# Patient Record
Sex: MALE | Race: OTHER | NOT HISPANIC OR LATINO
[De-identification: names, ages, dates, MRNs, and addresses within clinical notes are randomized per-mention and may not be internally consistent; named-entity substitution may affect disease eponyms.]

---

## 2017-08-01 ENCOUNTER — RESULT REVIEW (OUTPATIENT)
Age: 57
End: 2017-08-01

## 2019-03-04 ENCOUNTER — RESULT REVIEW (OUTPATIENT)
Age: 59
End: 2019-03-04

## 2019-06-14 ENCOUNTER — RESULT REVIEW (OUTPATIENT)
Age: 59
End: 2019-06-14

## 2021-05-12 ENCOUNTER — APPOINTMENT (OUTPATIENT)
Dept: COLORECTAL SURGERY | Facility: CLINIC | Age: 61
End: 2021-05-12
Payer: COMMERCIAL

## 2021-05-12 VITALS
BODY MASS INDEX: 28.88 KG/M2 | WEIGHT: 195 LBS | SYSTOLIC BLOOD PRESSURE: 166 MMHG | HEART RATE: 104 BPM | TEMPERATURE: 98.1 F | DIASTOLIC BLOOD PRESSURE: 112 MMHG | HEIGHT: 69 IN

## 2021-05-12 DIAGNOSIS — F17.210 NICOTINE DEPENDENCE, CIGARETTES, UNCOMPLICATED: ICD-10-CM

## 2021-05-12 DIAGNOSIS — Z82.49 FAMILY HISTORY OF ISCHEMIC HEART DISEASE AND OTHER DISEASES OF THE CIRCULATORY SYSTEM: ICD-10-CM

## 2021-05-12 DIAGNOSIS — J45.909 UNSPECIFIED ASTHMA, UNCOMPLICATED: ICD-10-CM

## 2021-05-12 DIAGNOSIS — I10 ESSENTIAL (PRIMARY) HYPERTENSION: ICD-10-CM

## 2021-05-12 DIAGNOSIS — Z83.3 FAMILY HISTORY OF DIABETES MELLITUS: ICD-10-CM

## 2021-05-12 DIAGNOSIS — K21.9 GASTRO-ESOPHAGEAL REFLUX DISEASE W/OUT ESOPHAGITIS: ICD-10-CM

## 2021-05-12 PROCEDURE — 46600 DIAGNOSTIC ANOSCOPY SPX: CPT

## 2021-05-12 PROCEDURE — 99202 OFFICE O/P NEW SF 15 MIN: CPT | Mod: 25

## 2021-05-12 PROCEDURE — 99072 ADDL SUPL MATRL&STAF TM PHE: CPT

## 2021-05-12 RX ORDER — LOSARTAN POTASSIUM 100 MG/1
TABLET, FILM COATED ORAL
Refills: 0 | Status: ACTIVE | COMMUNITY

## 2021-05-12 RX ORDER — HYDROCHLOROTHIAZIDE 12.5 MG/1
CAPSULE ORAL
Refills: 0 | Status: ACTIVE | COMMUNITY

## 2021-05-12 RX ORDER — ALBUTEROL SULFATE 90 UG/1
AEROSOL, METERED RESPIRATORY (INHALATION)
Refills: 0 | Status: ACTIVE | COMMUNITY

## 2021-05-12 NOTE — PHYSICAL EXAM
[Abdomen Masses] : No abdominal masses [Abdomen Tenderness] : ~T No ~M abdominal tenderness [No HSM] : no hepatosplenomegaly [Posterior] : posteriorly [Normal] : was normal [None] : no [de-identified] : Mild perianal fissuring of the anal margin extending into the buttock cleft [FreeTextEntry1] : A lighted anoscope was passed into the anal canal and the entire anal mucosal surface was inspected..  The findings revealed moderate internal hemorrhoids.\par Posterior and right posterior polypoid 1.5  cm lesions at the proximal anal canal

## 2021-05-12 NOTE — ASSESSMENT
[FreeTextEntry1] : I have recommended that the patient in a rectal polyp for definitive assessment of this lesion. The risks, benefits and alternatives were outlined including but not limited to bleeding, infection, need for future procedures.\par \par In addition the patient will be admitted for a screening colonoscopy given the incomplete colonoscopy secondary to incomplete preparation.\par \par I have reviewed with the patient in detail the role of colonoscopy any evaluation of possible colon polyps and cancer. The risks, alternatives and benefits of the procedure were detailed and client including but not limited to risk of bleeding, risk of infection, risk of perforation and requiring further and potential surgery, and other secondary complications of the procedure. The colonoscopy will be performed to evaluate for possible polyps and cancer and if possible a polypectomy or removal of the polyp will be performed. Bowel preparation instructions were reviewed. The need for long-term surveillance based on findings of the colonoscopy were discussed.\par The patient consents to the planned procedure.\par \par

## 2021-05-12 NOTE — HISTORY OF PRESENT ILLNESS
[FreeTextEntry1] : 61 y/o M presents for evaluation of rectal mass, referred by Dr. Fuentes \par \par EGD and Colonoscopy completed 5/11/21 for GERD symptoms and h/o polyps\par - sever LA class C reflux esophagitis\par - possible christensen's esophagus, multiple biopsies \par - mild peptic stricture\par - diaphragmatic hernia without obstruction or gangrene\par - two large, nodular anorectal junction masses were noted and biopsied \par - otherwise prep was poor and precluded an evaluation of the colorectum \par \par Last colonoscopy completed within the last 10 years, several benign polyps removed\par \par Denies abdominal or rectal pain, rectal bleeding, itching\par Denies N/V, change in appetite or weight \par \par Patient to start on Omeprazole once GI gets prior authorization. Has  been using prilosec OTC\par BH:daily\par Denies constipation, diarrhea or straining\par No use of stool softeners, fiber supplements or laxatives. Has used enemas in the past PRN for constipation, has not used recently \par Reports adequate dietary fiber intake. Currently drinking 64 oz. of water daily \par (-) anal receptive sex \par Denies FMH of GI disorders or GI cancers\par No ASA/NSAIDs last 7 days \par

## 2021-06-04 DIAGNOSIS — Z01.818 ENCOUNTER FOR OTHER PREPROCEDURAL EXAMINATION: ICD-10-CM

## 2021-06-07 ENCOUNTER — APPOINTMENT (OUTPATIENT)
Dept: DISASTER EMERGENCY | Facility: CLINIC | Age: 61
End: 2021-06-07

## 2021-06-08 LAB — SARS-COV-2 N GENE NPH QL NAA+PROBE: NOT DETECTED

## 2021-06-09 ENCOUNTER — TRANSCRIPTION ENCOUNTER (OUTPATIENT)
Age: 61
End: 2021-06-09

## 2021-06-09 VITALS
HEART RATE: 94 BPM | RESPIRATION RATE: 18 BRPM | WEIGHT: 197.75 LBS | DIASTOLIC BLOOD PRESSURE: 95 MMHG | HEIGHT: 69 IN | TEMPERATURE: 98 F | SYSTOLIC BLOOD PRESSURE: 158 MMHG | OXYGEN SATURATION: 98 %

## 2021-06-09 NOTE — ASU PATIENT PROFILE, ADULT - PMH
Anal lesion    Asthma, unspecified asthma severity, unspecified whether complicated, unspecified whether persistent    Benign neoplasm of colon, unspecified part of colon    BPH with elevated PSA    Dyslipidemia    Hemangioma, unspecified site    History of gastroesophageal reflux (GERD)    HTN (hypertension)    Internal hemorrhoids    Onychodystrophy    Other specified forms of hearing loss    Pes planus, unspecified laterality

## 2021-06-10 ENCOUNTER — RESULT REVIEW (OUTPATIENT)
Age: 61
End: 2021-06-10

## 2021-06-10 ENCOUNTER — INPATIENT (INPATIENT)
Facility: HOSPITAL | Age: 61
LOS: 1 days | Discharge: HOME | DRG: 395 | End: 2021-06-12
Attending: SURGERY | Admitting: SURGERY
Payer: COMMERCIAL

## 2021-06-10 ENCOUNTER — APPOINTMENT (OUTPATIENT)
Dept: COLORECTAL SURGERY | Facility: HOSPITAL | Age: 61
End: 2021-06-10

## 2021-06-10 DIAGNOSIS — Z98.890 OTHER SPECIFIED POSTPROCEDURAL STATES: Chronic | ICD-10-CM

## 2021-06-10 PROCEDURE — 99222 1ST HOSP IP/OBS MODERATE 55: CPT

## 2021-06-10 PROCEDURE — 45171 EXC RECT TUM TRANSANAL PART: CPT | Mod: GC

## 2021-06-10 RX ORDER — ACETAMINOPHEN 500 MG
650 TABLET ORAL ONCE
Refills: 0 | Status: DISCONTINUED | OUTPATIENT
Start: 2021-06-10 | End: 2021-06-12

## 2021-06-10 RX ORDER — HEPARIN SODIUM 5000 [USP'U]/ML
5000 INJECTION INTRAVENOUS; SUBCUTANEOUS EVERY 8 HOURS
Refills: 0 | Status: DISCONTINUED | OUTPATIENT
Start: 2021-06-10 | End: 2021-06-12

## 2021-06-10 RX ORDER — SOD SULF/SODIUM/NAHCO3/KCL/PEG
4000 SOLUTION, RECONSTITUTED, ORAL ORAL ONCE
Refills: 0 | Status: COMPLETED | OUTPATIENT
Start: 2021-06-10 | End: 2021-06-10

## 2021-06-10 RX ORDER — PANTOPRAZOLE SODIUM 20 MG/1
40 TABLET, DELAYED RELEASE ORAL ONCE
Refills: 0 | Status: COMPLETED | OUTPATIENT
Start: 2021-06-10 | End: 2021-06-10

## 2021-06-10 RX ORDER — SOD SULF/SODIUM/NAHCO3/KCL/PEG
4000 SOLUTION, RECONSTITUTED, ORAL ORAL ONCE
Refills: 0 | Status: DISCONTINUED | OUTPATIENT
Start: 2021-06-10 | End: 2021-06-10

## 2021-06-10 RX ORDER — HYDRALAZINE HCL 50 MG
5 TABLET ORAL ONCE
Refills: 0 | Status: COMPLETED | OUTPATIENT
Start: 2021-06-10 | End: 2021-06-10

## 2021-06-10 RX ORDER — ONDANSETRON 8 MG/1
4 TABLET, FILM COATED ORAL ONCE
Refills: 0 | Status: DISCONTINUED | OUTPATIENT
Start: 2021-06-10 | End: 2021-06-12

## 2021-06-10 RX ORDER — POLYETHYLENE GLYCOL 3350 17 G/17G
17 POWDER, FOR SOLUTION ORAL ONCE
Refills: 0 | Status: DISCONTINUED | OUTPATIENT
Start: 2021-06-10 | End: 2021-06-10

## 2021-06-10 RX ADMIN — Medication 10 MILLIGRAM(S): at 21:06

## 2021-06-10 RX ADMIN — Medication 5 MILLIGRAM(S): at 21:10

## 2021-06-10 RX ADMIN — Medication 4000 MILLILITER(S): at 17:39

## 2021-06-10 RX ADMIN — PANTOPRAZOLE SODIUM 40 MILLIGRAM(S): 20 TABLET, DELAYED RELEASE ORAL at 14:43

## 2021-06-10 RX ADMIN — HEPARIN SODIUM 5000 UNIT(S): 5000 INJECTION INTRAVENOUS; SUBCUTANEOUS at 21:06

## 2021-06-10 RX ADMIN — HEPARIN SODIUM 5000 UNIT(S): 5000 INJECTION INTRAVENOUS; SUBCUTANEOUS at 14:43

## 2021-06-10 NOTE — CONSULT NOTE ADULT - ATTENDING COMMENTS
Will plan for a colonoscopy tomorrow prior with poor prep.  Excision of anal masses today.  Will give split prep Golytely this víctor and tomorrow to ensure a good prep.

## 2021-06-10 NOTE — BRIEF OPERATIVE NOTE - OPERATION/FINDINGS
Anal and pudendal nerve blocks. Two large masses identified, largest right anterior and smaller right posterior. Excised with Ligasure and oversewn with 3-0 Vicryl figure of 8.

## 2021-06-10 NOTE — BRIEF OPERATIVE NOTE - DISPOSITION
Anesthesia ROS/Med Hx    Overall Review:  Pts. EKG was reviewed and Pts.echo was reviewed     Pulmonary Review:    Pt. positive for sleep apnea - CPAP    Neuro/Psych Review:    Negative for all neuro/psych ROS    Cardiovascular Review:    Pt. positive for CHF  Pt. positive for CAD  Pt. positive for hyperlipidemia  Pt. positive for dysrhythmias - Paroxysmal A-fib    GI/HEPATIC/RENAL Review:    Pt. positive for GERD  Pt. positive for liver disease  Pt. positive for renal disease    End/Other Review:    Pt. positive for diabetes  Pt. positive for anemia      Anesthesia Plan     ASA Status: 3  Anesthesia Type: MAC  Reviewed: Lab Results, Medications, Beta Blocker Status, Nursing Notes, Consultations, EKG, Problem List, Pre-Induction Reassessment, NPO Status, Past Med History, Patient Summary and Allergies  The proposed anesthetic plan, including its risks and benefits, have been discussed with the Patient - along with the risks and benefits of alternatives.  Questions were encouraged and answered and the patient and/or representative agrees to proceed.  Blood Products: Not Anticipated      Physical Exam  Mallampati: II  TM Distance: >3 FB  Neck ROM: Full  Cardio Rhythm: Irregular  pulmonary exam normal  dental exam normal                  
PACU then regional

## 2021-06-10 NOTE — PROGRESS NOTE ADULT - SUBJECTIVE AND OBJECTIVE BOX
POST OP CHECK    Procedure: Transanal excision of anorectal mass x 2  Surgeon: Dr. Parham     S: Pt seen and examined at bedside. Reports no acute complaints. Denies F, N, V, CP, SOB, FLOYD, calf tenderness. Pain controlled with medication. Pt is HD stable.    O:  T(C): 36.8 (06-10-21 @ 14:20), Max: 36.8 (06-10-21 @ 14:20)  T(F): 98.3 (06-10-21 @ 14:20), Max: 98.3 (06-10-21 @ 14:20)  HR: 79 (06-10-21 @ 14:20) (60 - 82)  BP: 145/89 (06-10-21 @ 14:20) (129/89 - 155/93)  RR: 20 (06-10-21 @ 14:20) (15 - 24)  SpO2: 95% (06-10-21 @ 14:20) (95% - 100%)  Wt(kg): --    Gen: NAD, resting comfortably in bed, A&O x3   C/V: NSR  Pulm: Nonlabored breathing, no respiratory distress  Abd: abdomen soft, nd, nt   Rectal: Dressing c/d/i. No oozing or bleeding noted.   Extrem: WWP, no calf tenderness or edema, SCDs in place    A/P: 60M PMHx esophagitis, recent diagnosis of anorectal masses x 2 identified on screening colonoscopy 5/11/2021 presents for transanal excision of anorectal masses (6/10).    Pain/nausea control prn  IS/OOB  CLD, NPO @ mn w/ sips/chips   SCDs, HSQ  Plan for colonoscopy (Dr. Milian) 6/11, bowel prep w/ golytely 2L @ 5pm/dulcolax 10mg PO x 1 at bedtime / 2L golytely @ 5am 6/11  AM labs

## 2021-06-10 NOTE — H&P ADULT - ASSESSMENT
60M PMHx esophagitis, recent diagnosis of anorectal masses x 2 identified on screening colonoscopy 5/11/2021 presents for transanal excision of anorectal mass.     to OR for above  admit to colorectal surgery  colonoscopy tomorrow with Dr. Milian due to poor prep on prior scope

## 2021-06-10 NOTE — H&P ADULT - HISTORY OF PRESENT ILLNESS
60M PMHx esophagitis, recent diagnosis of anorectal masses x 2 identified on screening colonoscopy 5/11/2021 presents for transanal excision of anorectal mass. EGD and colonoscopy demonstrated two large nodular anorectal junction masses with poor prep, precluded evaluation of colon. Last prior colonoscopy with several benign polyps. Asymptomatic.    Denies constipation, diarrhea or straining  No use of stool softeners, fiber supplements or laxatives. Has used enemas in the past PRN for constipation, has not used recently   Reports adequate dietary fiber intake. Currently drinking 64 oz. of water daily   (-) anal receptive sex   Denies FMH of GI disorders or GI cancers  No ASA/NSAIDs last 7 days

## 2021-06-10 NOTE — CONSULT NOTE ADULT - SUBJECTIVE AND OBJECTIVE BOX
GASTROENTEROLOGY CONSULT NOTE  HPI:  60M PMHx esophagitis, recent diagnosis of anorectal masses x 2 identified on screening colonoscopy 5/11/2021 presents for transanal excision of anorectal mass. EGD and colonoscopy demonstrated two large nodular anorectal junction masses with poor prep, precluded evaluation of colon. Last prior colonoscopy with several benign polyps. Asymptomatic.    Denies constipation, diarrhea or straining  No use of stool softeners, fiber supplements or laxatives. Has used enemas in the past PRN for constipation, has not used recently   Reports adequate dietary fiber intake. Currently drinking 64 oz. of water daily   (-) anal receptive sex   Denies FMH of GI disorders or GI cancers  No ASA/NSAIDs last 7 days    (10 Steve 2021 11:19)    Allergies    aspirin (Unknown)  latex (Unknown)    Intolerances      Home Medications:    MEDICATIONS:  MEDICATIONS  (STANDING):  heparin   Injectable 5000 Unit(s) SubCutaneous every 8 hours  pantoprazole    Tablet 40 milliGRAM(s) Oral Once  polyethylene glycol 3350 17 Gram(s) Oral Once    MEDICATIONS  (PRN):  acetaminophen   Tablet .. 650 milliGRAM(s) Oral Once PRN Moderate Pain (4 - 6)  ondansetron Injectable 4 milliGRAM(s) IV Push Once PRN Nausea    PAST MEDICAL & SURGICAL HISTORY:  HTN (hypertension)    Dyslipidemia    Onychodystrophy    Pes planus, unspecified laterality    BPH with elevated PSA    Anal lesion    Other specified forms of hearing loss    Benign neoplasm of colon, unspecified part of colon    Hemangioma, unspecified site    Internal hemorrhoids    History of gastroesophageal reflux (GERD)    Asthma    History of prostate surgery  bx 2014      FAMILY HISTORY:  No pertinent family history in first degree relatives      SOCIAL HISTORY:  [place]: [ ] Current, [ ] Former, [ ] Never; Pack Years:  Alcohol:  Illicit Drugs:    REVIEW OF SYSTEMS:  CONSTITUTIONAL: No fevers or chills  HEENT: No visual changes; No vertigo or throat pain   NECK: No pain or stiffness  RESPIRATORY: No cough, wheezing, hemoptysis; No shortness of breath  CARDIOVASCULAR: No chest pain or palpitations  GASTROINTESTINAL: as above  GENITOURINARY: No dysuria, frequency or hematuria  NEUROLOGICAL: No numbness or weakness  SKIN: No itching, burning, rashes, or lesions   All other 10 review of systems is negative unless indicated above.    Vital Signs Last 24 Hrs  T(C): 36.2 (10 Steve 2021 12:21), Max: 36.7 (09 Jun 2021 13:42)  T(F): 97.2 (10 Steve 2021 12:21), Max: 98.1 (09 Jun 2021 13:42)  HR: 76 (10 Steve 2021 12:36) (76 - 94)  BP: 129/89 (10 Steve 2021 12:36) (129/89 - 158/95)  BP(mean): 104 (10 Steve 2021 12:36) (104 - 110)  RR: 17 (10 Steve 2021 12:36) (17 - 19)  SpO2: 98% (10 Steve 2021 12:36) (97% - 98%)      PHYSICAL EXAM:    General: Well developed; in no acute distress  Eyes: Anicteric sclerae, moist conjunctivae  HENT: Moist mucous membranes  Neck: Trachea midline, supple  Lungs: Normal respiratory effort  Cardiovascular: RRR  Abdomen: Soft, non-tender non-distended; No rebound or guarding  Extremities: Normal range of motion, No clubbing, cyanosis or edema  Neurological: Alert and oriented x3  Skin: Warm and dry. No obvious rash    LABS:                  RADIOLOGY & ADDITIONAL STUDIES:     Reviewed

## 2021-06-10 NOTE — CONSULT NOTE ADULT - ASSESSMENT
60M PMHx esophagitis, recent diagnosis of anorectal masses x 2 identified on screening colonoscopy 5/11/2021 presents for transanal excision of anorectal mass. EGD and colonoscopy demonstrated two large nodular anorectal junction masses with poor prep, precluded evaluation of colon. Last prior colonoscopy with several benign polyps. GI is consulted for a repeat colonoscopy as the previous one was incomplete due to poor prep    EGD and Colonoscopy completed 5/11/21 for GERD symptoms and h/o polyps:   - sever LA class C reflux esophagitis  - possible christensen's esophagus, multiple biopsies   - mild peptic stricture  - diaphragmatic hernia without obstruction or gangrene  - two large, nodular anorectal junction masses were noted and biopsied   - otherwise prep was poor and precluded an evaluation of the colorectum     Last colonoscopy completed within the last 10 years, several benign polyps removed    # Rectal mass  POD 0 s/p excision via transanal approach  Needs colonoscopy to evaluate remaining colon for any synchronous lesions/polyps  - Scheduled for colonoscopy tomorrow  - Please order split-dose prep with 4L GoLytely: 2L GoLytely given at 17:00 today and an additional 2L at 05:00 tomorrow  - NPO past MN except medications with sips of water (and remainining 2L GoLytely at 05:00 am as mentioned above)    Recommendations discussed with primary team  Plan discussed with GI service attending    Chase Braxton MD  PGY-4 GI fellow  Pager: [phone]         60M PMHx esophagitis, recent diagnosis of anorectal masses x 2 identified on screening colonoscopy 5/11/2021 presents for transanal excision of anorectal mass. EGD and colonoscopy demonstrated two large nodular anorectal junction masses with poor prep, precluded evaluation of colon. Last prior colonoscopy with several benign polyps. GI is consulted for a repeat colonoscopy as the previous one was incomplete due to poor prep  EGD and Colonoscopy was done previously on 5/11/21 for GERD symptoms and h/o polyps:   - sever LA class C reflux esophagitis  - possible christensen's esophagus, multiple biopsies   - mild peptic stricture  - diaphragmatic hernia without obstruction or gangrene  - two large, nodular anorectal junction masses were noted and biopsied   - otherwise prep was poor and precluded an evaluation of the colorectum     Last colonoscopy completed within the last 10 years, several benign polyps removed    # Rectal mass  POD 0 s/p excision via transanal approach  Needs colonoscopy to evaluate remaining colon for any synchronous lesions/polyps  - Scheduled for colonoscopy tomorrow  - Please order split-dose prep with 4L GoLytely: 2L GoLytely given at 17:00 today and an additional 2L at 05:00 tomorrow  - PO Dulcolax 10 mg tonight   - NPO past MN except medications with sips of water (and remainining 2L GoLytely at 05:00 am as mentioned above)    Recommendations discussed with primary team  Plan discussed with GI service attending    Chase Braxton MD  PGY-4 GI fellow  Pager: [phone]

## 2021-06-10 NOTE — H&P ADULT - NSICDXPASTMEDICALHX_GEN_ALL_CORE_FT
PAST MEDICAL HISTORY:  Anal lesion     Asthma     Benign neoplasm of colon, unspecified part of colon     BPH with elevated PSA     Dyslipidemia     Hemangioma, unspecified site     History of gastroesophageal reflux (GERD)     HTN (hypertension)     Internal hemorrhoids     Onychodystrophy     Other specified forms of hearing loss     Pes planus, unspecified laterality

## 2021-06-11 ENCOUNTER — RESULT REVIEW (OUTPATIENT)
Age: 61
End: 2021-06-11

## 2021-06-11 LAB
ANION GAP SERPL CALC-SCNC: 13 MMOL/L — SIGNIFICANT CHANGE UP (ref 5–17)
BUN SERPL-MCNC: 9 MG/DL — SIGNIFICANT CHANGE UP (ref 7–23)
CALCIUM SERPL-MCNC: 10.7 MG/DL — HIGH (ref 8.4–10.5)
CHLORIDE SERPL-SCNC: 105 MMOL/L — SIGNIFICANT CHANGE UP (ref 96–108)
CO2 SERPL-SCNC: 25 MMOL/L — SIGNIFICANT CHANGE UP (ref 22–31)
COVID-19 SPIKE DOMAIN AB INTERP: POSITIVE
COVID-19 SPIKE DOMAIN ANTIBODY RESULT: 12.2 U/ML — HIGH
CREAT SERPL-MCNC: 0.88 MG/DL — SIGNIFICANT CHANGE UP (ref 0.5–1.3)
GLUCOSE SERPL-MCNC: 85 MG/DL — SIGNIFICANT CHANGE UP (ref 70–99)
HCT VFR BLD CALC: 50 % — SIGNIFICANT CHANGE UP (ref 39–50)
HGB BLD-MCNC: 16.4 G/DL — SIGNIFICANT CHANGE UP (ref 13–17)
MAGNESIUM SERPL-MCNC: 2 MG/DL — SIGNIFICANT CHANGE UP (ref 1.6–2.6)
MCHC RBC-ENTMCNC: 28.8 PG — SIGNIFICANT CHANGE UP (ref 27–34)
MCHC RBC-ENTMCNC: 32.8 GM/DL — SIGNIFICANT CHANGE UP (ref 32–36)
MCV RBC AUTO: 87.7 FL — SIGNIFICANT CHANGE UP (ref 80–100)
NRBC # BLD: 0 /100 WBCS — SIGNIFICANT CHANGE UP (ref 0–0)
PHOSPHATE SERPL-MCNC: 2.6 MG/DL — SIGNIFICANT CHANGE UP (ref 2.5–4.5)
PLATELET # BLD AUTO: 387 K/UL — SIGNIFICANT CHANGE UP (ref 150–400)
POTASSIUM SERPL-MCNC: 3.8 MMOL/L — SIGNIFICANT CHANGE UP (ref 3.5–5.3)
POTASSIUM SERPL-SCNC: 3.8 MMOL/L — SIGNIFICANT CHANGE UP (ref 3.5–5.3)
RBC # BLD: 5.7 M/UL — SIGNIFICANT CHANGE UP (ref 4.2–5.8)
RBC # FLD: 14.5 % — SIGNIFICANT CHANGE UP (ref 10.3–14.5)
SARS-COV-2 IGG+IGM SERPL QL IA: 12.2 U/ML — HIGH
SARS-COV-2 IGG+IGM SERPL QL IA: POSITIVE
SODIUM SERPL-SCNC: 143 MMOL/L — SIGNIFICANT CHANGE UP (ref 135–145)
WBC # BLD: 8.73 K/UL — SIGNIFICANT CHANGE UP (ref 3.8–10.5)
WBC # FLD AUTO: 8.73 K/UL — SIGNIFICANT CHANGE UP (ref 3.8–10.5)

## 2021-06-11 PROCEDURE — 88305 TISSUE EXAM BY PATHOLOGIST: CPT | Mod: 26

## 2021-06-11 PROCEDURE — 45380 COLONOSCOPY AND BIOPSY: CPT | Mod: GC

## 2021-06-11 RX ORDER — BENZOCAINE AND MENTHOL 5; 1 G/100ML; G/100ML
1 LIQUID ORAL
Refills: 0 | Status: DISCONTINUED | OUTPATIENT
Start: 2021-06-11 | End: 2021-06-12

## 2021-06-11 RX ORDER — POTASSIUM PHOSPHATE, MONOBASIC POTASSIUM PHOSPHATE, DIBASIC 236; 224 MG/ML; MG/ML
15 INJECTION, SOLUTION INTRAVENOUS ONCE
Refills: 0 | Status: COMPLETED | OUTPATIENT
Start: 2021-06-11 | End: 2021-06-11

## 2021-06-11 RX ORDER — SODIUM CHLORIDE 9 MG/ML
1000 INJECTION, SOLUTION INTRAVENOUS
Refills: 0 | Status: DISCONTINUED | OUTPATIENT
Start: 2021-06-11 | End: 2021-06-11

## 2021-06-11 RX ORDER — BENZOCAINE AND MENTHOL 5; 1 G/100ML; G/100ML
1 LIQUID ORAL
Refills: 0 | Status: DISCONTINUED | OUTPATIENT
Start: 2021-06-11 | End: 2021-06-11

## 2021-06-11 RX ADMIN — BENZOCAINE AND MENTHOL 1 LOZENGE: 5; 1 LIQUID ORAL at 18:39

## 2021-06-11 RX ADMIN — HEPARIN SODIUM 5000 UNIT(S): 5000 INJECTION INTRAVENOUS; SUBCUTANEOUS at 21:39

## 2021-06-11 RX ADMIN — HEPARIN SODIUM 5000 UNIT(S): 5000 INJECTION INTRAVENOUS; SUBCUTANEOUS at 05:29

## 2021-06-11 RX ADMIN — POTASSIUM PHOSPHATE, MONOBASIC POTASSIUM PHOSPHATE, DIBASIC 62.5 MILLIMOLE(S): 236; 224 INJECTION, SOLUTION INTRAVENOUS at 10:08

## 2021-06-11 RX ADMIN — HEPARIN SODIUM 5000 UNIT(S): 5000 INJECTION INTRAVENOUS; SUBCUTANEOUS at 14:01

## 2021-06-11 NOTE — PROGRESS NOTE ADULT - SUBJECTIVE AND OBJECTIVE BOX
INTERVAL HPI/OVERNIGHT EVENTS: patient finished 2L of Golytel at 10 pm, started 2L of Golytel at 5 am , hydralazine 5 mg    STATUS POST: 6/10: transanal excision of anorectal mass x 2    POST OPERATIVE DAY #: 1    SUBJECTIVE: Pt seen and examined at bedside this am by surgery team. Reports no acute complaints. Tolerating diet, pain well controlled. Denies f/n/v/cp/sob.    MEDICATIONS  (STANDING):  heparin   Injectable 5000 Unit(s) SubCutaneous every 8 hours  lactated ringers. 1000 milliLiter(s) (100 mL/Hr) IV Continuous <Continuous>  potassium phosphate IVPB 15 milliMole(s) IV Intermittent once    MEDICATIONS  (PRN):  acetaminophen   Tablet .. 650 milliGRAM(s) Oral Once PRN Moderate Pain (4 - 6)  ondansetron Injectable 4 milliGRAM(s) IV Push Once PRN Nausea      Vital Signs Last 24 Hrs  T(C): 36.5 (11 Jun 2021 08:47), Max: 36.8 (10 Steve 2021 14:20)  T(F): 97.7 (11 Jun 2021 08:47), Max: 98.3 (10 Steve 2021 14:20)  HR: 88 (11 Jun 2021 08:47) (60 - 93)  BP: 166/112 (11 Jun 2021 08:47) (129/89 - 171/99)  BP(mean): 115 (10 Steve 2021 13:21) (104 - 115)  RR: 16 (11 Jun 2021 08:47) (15 - 24)  SpO2: 97% (11 Jun 2021 08:47) (95% - 100%)    PHYSICAL EXAM:    Constitutional: A&Ox3, NAD    Respiratory: non labored breathing, no respiratory distress    Cardiovascular: NSR, RRR    Gastrointestinal: abdomen soft, nd, nt     Rectal: Dressing c/d/i. No oozing or bleeding noted.     Extremities: WWP, no calf tenderness or edema, SCDs in place    I&O's Detail    10 Steve 2021 07:01  -  11 Jun 2021 07:00  --------------------------------------------------------  IN:    Lactated Ringers: 400 mL  Total IN: 400 mL    OUT:    Voided (mL): 550 mL  Total OUT: 550 mL    Total NET: -150 mL      11 Jun 2021 07:01  -  11 Jun 2021 09:36  --------------------------------------------------------  IN:    Lactated Ringers: 300 mL  Total IN: 300 mL    OUT:  Total OUT: 0 mL    Total NET: 300 mL          LABS:                        16.4   8.73  )-----------( 387      ( 11 Jun 2021 07:28 )             50.0     06-11    143  |  105  |  9   ----------------------------<  85  3.8   |  25  |  0.88    Ca    10.7<H>      11 Jun 2021 07:28  Phos  2.6     06-11  Mg     2.0     06-11            RADIOLOGY & ADDITIONAL STUDIES:

## 2021-06-11 NOTE — CHART NOTE - NSCHARTNOTEFT_GEN_A_CORE
Colonoscopy (with Dr Milian)    A sub-centimeter polyp in the sigmoid colon (50 cm from AV) s/p excision biopsy with cold forceps. Otherwise normal colonoscopy.       Plan:   Await pathology results  resume diet  Next screening colonoscopy based on the rectal lesion path

## 2021-06-12 ENCOUNTER — TRANSCRIPTION ENCOUNTER (OUTPATIENT)
Age: 61
End: 2021-06-12

## 2021-06-12 VITALS
OXYGEN SATURATION: 96 % | HEART RATE: 68 BPM | DIASTOLIC BLOOD PRESSURE: 95 MMHG | RESPIRATION RATE: 18 BRPM | TEMPERATURE: 98 F | SYSTOLIC BLOOD PRESSURE: 151 MMHG

## 2021-06-12 PROCEDURE — 83735 ASSAY OF MAGNESIUM: CPT

## 2021-06-12 PROCEDURE — 86769 SARS-COV-2 COVID-19 ANTIBODY: CPT

## 2021-06-12 PROCEDURE — 36415 COLL VENOUS BLD VENIPUNCTURE: CPT

## 2021-06-12 PROCEDURE — 80048 BASIC METABOLIC PNL TOTAL CA: CPT

## 2021-06-12 PROCEDURE — 85027 COMPLETE CBC AUTOMATED: CPT

## 2021-06-12 PROCEDURE — 84100 ASSAY OF PHOSPHORUS: CPT

## 2021-06-12 PROCEDURE — 88305 TISSUE EXAM BY PATHOLOGIST: CPT

## 2021-06-12 RX ORDER — DOCUSATE SODIUM 100 MG
1 CAPSULE ORAL
Qty: 60 | Refills: 0
Start: 2021-06-12

## 2021-06-12 RX ORDER — FAMOTIDINE 10 MG/ML
20 INJECTION INTRAVENOUS ONCE
Refills: 0 | Status: COMPLETED | OUTPATIENT
Start: 2021-06-12 | End: 2021-06-12

## 2021-06-12 RX ADMIN — HEPARIN SODIUM 5000 UNIT(S): 5000 INJECTION INTRAVENOUS; SUBCUTANEOUS at 06:42

## 2021-06-12 RX ADMIN — FAMOTIDINE 20 MILLIGRAM(S): 10 INJECTION INTRAVENOUS at 00:28

## 2021-06-12 RX ADMIN — BENZOCAINE AND MENTHOL 1 LOZENGE: 5; 1 LIQUID ORAL at 02:20

## 2021-06-12 NOTE — DISCHARGE NOTE PROVIDER - CARE PROVIDER_API CALL
Adal Parham)  ColonRectal Surgery; Surgery  [facility], [address]  [place], [place] [address]  Phone: [phone]  Fax: [phone]  Follow Up Time:

## 2021-06-12 NOTE — DISCHARGE NOTE PROVIDER - HOSPITAL COURSE
61 yo M w/ PMH of esophagitis, recent diagnosis of anorectal masses x 2 identified on screening colonoscopy 5/11/2021 presents for transanal excision of anorectal mass. EGD and colonoscopy demonstrated two large nodular anorectal junction masses w/ poor prep, precluded evaluation of colon. Last prior colonoscopy w/ several benign polyps. Asymptomatic.    6/10: Transanal excision of anorectal mass x 2  6/11: Colonoscopy: sub-centimeter polyp in sigmoid colon (50 cm from AV) s/p excision biopsy w/ cold forceps    The patient tolerated both procedures well, his POC was WNL. He passed his ToV. Since the procedure the patient was slowly advanced to LRD, which he tolerated well w/o nausea or vomiting.    On the day of discharge, the patient was having adequate bowel function and urination and was stable to be discharged home.

## 2021-06-12 NOTE — PROGRESS NOTE ADULT - ATTENDING COMMENTS
CRS Attending Coverage for Dr Parham  Patient seen and examined on morning rounds  Discussed with Chief Resident Dr Sky Hawkins  Tolerating diet after colonoscopy, having flatus   Denies abdominal pain or BRBPR  d/c to home, f/u outpatient with Dr Parham

## 2021-06-12 NOTE — DISCHARGE NOTE PROVIDER - NSDCFUADDINST_GEN_ALL_CORE_FT
Please follow up with Dr. Parham next week. Call his office to schedule an appointment: [phone].     You may shower; soap and water over incision sites. Do not scrub. Pat dry when done.    Ambulate as tolerated, but no heavy lifting (>10lbs) or strenuous exercise.     Call the office if you experience increasing pain, nausea, vomiting, swelling, redness, or leakage from stoma site, temperature >101.4F.

## 2021-06-12 NOTE — PROGRESS NOTE ADULT - ASSESSMENT
60M PMHx esophagitis, recent diagnosis of anorectal masses x 2 identified on screening colonoscopy 5/11/2021 presents for transanal excision of anorectal masses (6/10).    pain/nausea control prn  IS/OOB  NPO w/ sips/chips/IVF  SCDs, HSQ  colonoscopy with Maicol 6/11, bowel prep  AM labs   Possible dc later today  
61 yo M w/ PMH of esophagitis, recent diagnosis of anorectal masses x 2 identified on screening colonoscopy 5/11/2021 presents for transanal excision of anorectal masses (6/10). Now s/p colonoscopy showing sub-centimeter polyp in sigmoid colon (50 cm from AV) s/p excision biopsy w/ cold forceps, overall unremarkable on 6/11.    Pain/nausea control   IS/OOB  LFD/IVF   SCDs, SQH  AM labs   Dc 6/12  
WDL

## 2021-06-12 NOTE — DISCHARGE NOTE PROVIDER - NSDCCPCAREPLAN_GEN_ALL_CORE_FT
PRINCIPAL DISCHARGE DIAGNOSIS  Diagnosis: Rectal mass  Assessment and Plan of Treatment:       SECONDARY DISCHARGE DIAGNOSES  Diagnosis: Asthma  Assessment and Plan of Treatment:     Diagnosis: History of gastroesophageal reflux (GERD)  Assessment and Plan of Treatment:     Diagnosis: Internal hemorrhoids  Assessment and Plan of Treatment:     Diagnosis: Hemangioma unspecified site  Assessment and Plan of Treatment:     Diagnosis: Benign neoplasm of colon, unspecified part of colon  Assessment and Plan of Treatment:     Diagnosis: Other specified forms of hearing loss  Assessment and Plan of Treatment:     Diagnosis: Anal lesion  Assessment and Plan of Treatment:     Diagnosis: BPH with elevated PSA  Assessment and Plan of Treatment:     Diagnosis: Pes planus, unspecified laterality  Assessment and Plan of Treatment:     Diagnosis: Onychodystrophy  Assessment and Plan of Treatment:     Diagnosis: Dyslipidemia  Assessment and Plan of Treatment:     Diagnosis: HTN (hypertension)  Assessment and Plan of Treatment:     Diagnosis: History of prostate surgery  Assessment and Plan of Treatment:

## 2021-06-12 NOTE — DISCHARGE NOTE PROVIDER - NSDCCPTREATMENT_GEN_ALL_CORE_FT
PRINCIPAL PROCEDURE  Procedure: Excision of rectal tumor by transanal approach  Findings and Treatment: Warning Signs:  Please call your doctor or nurse practitioner if you experience the following:  *You experience new chest pain, pressure, squeezing or tightness.  *New or worsening cough, shortness of breath, or wheeze.  *If you are vomiting and cannot keep down fluids or your medications.  *You are getting dehydrated due to continued vomiting, diarrhea, or other reasons. Signs of dehydration include dry mouth, rapid heartbeat, or feeling dizzy or faint when standing.  *You see blood or dark/black material when you vomit or have a bowel movement.  *You experience burning when you urinate, have blood in your urine, or experience a discharge.  *Your pain is not improving within 8-12 hours or is not gone within 24 hours. Call or return immediately if your pain is getting worse, changes location, or moves to your chest or back.  *You have shaking chills, or fever greater than 101.5 degrees Fahrenheit or 38 degrees Celsius.  *Any change in your symptoms, or any new symptoms that concern you.

## 2021-06-12 NOTE — DISCHARGE NOTE NURSING/CASE MANAGEMENT/SOCIAL WORK - PATIENT PORTAL LINK FT
You can access the FollowMyHealth Patient Portal offered by [facility] by registering at the following website: [email or web address]. By joining [employer]’s FollowMyHealth portal, you will also be able to view your health information using other applications (apps) compatible with our system.

## 2021-06-12 NOTE — PROGRESS NOTE ADULT - SUBJECTIVE AND OBJECTIVE BOX
POD:   Procedure:     SUBJECTIVE: Patient seen and examined by chief resident on morning rounds.        Vital Signs Last 24 Hrs  T(C): 36.8 (12 Jun 2021 05:13), Max: 36.9 (11 Jun 2021 23:47)  T(F): 98.3 (12 Jun 2021 05:13), Max: 98.5 (11 Jun 2021 23:47)  HR: 80 (12 Jun 2021 05:13) (78 - 88)  BP: 134/86 (12 Jun 2021 05:13) (134/86 - 166/112)  BP(mean): 103 (11 Jun 2021 20:35) (103 - 103)  RR: 17 (12 Jun 2021 05:13) (16 - 17)  SpO2: 95% (12 Jun 2021 05:13) (95% - 97%)    Physical Exam:  General: NAD  Pulmonary: Nonlabored breathing, no respiratory distress  Abdominal: soft, nondistended, nontender with no rebound or guarding  Extremities: WWP, normal strength, no clubbing/cyanosis/edema  Neuro: A/O x3    Lines/drains/tubes:    I&O's Summary    10 Steve 2021 07:01  -  11 Jun 2021 07:00  --------------------------------------------------------  IN: 400 mL / OUT: 550 mL / NET: -150 mL    11 Jun 2021 07:01  -  12 Jun 2021 06:15  --------------------------------------------------------  IN: 1340 mL / OUT: 1275 mL / NET: 65 mL        LABS:                        16.4   8.73  )-----------( 387      ( 11 Jun 2021 07:28 )             50.0     06-11    143  |  105  |  9   ----------------------------<  85  3.8   |  25  |  0.88    Ca    10.7<H>      11 Jun 2021 07:28  Phos  2.6     06-11  Mg     2.0     06-11          CAPILLARY BLOOD GLUCOSE            RADIOLOGY & ADDITIONAL STUDIES:     POD: 1  Procedure: Colonoscopy w/ excisional biopsy w/ cold forceps    SUBJECTIVE: Yesterday the patient underwent colonoscopy, which demonstrated sub-centimeter polyp in sigmoid colon that was excised w/ cold forceps 50 cm from AV. After the procedure the patient was advanced to CLD, which he tolerated well and then to LRD, which he tolerated w/o nausea or vomiting. Overnight there were no significant events, this morning the patient is feeling well. He was examined at the bedside by the chief resident. The patient denies having any complaints. He denies having chest pain, SOB, dizziness, chills.      Vital Signs Last 24 Hrs  T(C): 36.8 (12 Jun 2021 05:13), Max: 36.9 (11 Jun 2021 23:47)  T(F): 98.3 (12 Jun 2021 05:13), Max: 98.5 (11 Jun 2021 23:47)  HR: 80 (12 Jun 2021 05:13) (78 - 88)  BP: 134/86 (12 Jun 2021 05:13) (134/86 - 166/112)  BP(mean): 103 (11 Jun 2021 20:35) (103 - 103)  RR: 17 (12 Jun 2021 05:13) (16 - 17)  SpO2: 95% (12 Jun 2021 05:13) (95% - 97%)    Physical Exam:  General: NAD, resting comfortably in the bed  Pulmonary: Nonlabored breathing, no respiratory distress  Abdominal: soft, NT/ND, no rebound tenderness, guarding, rigidity  Extremities: WWP, normal strength, no clubbing/cyanosis/edema  Neuro: AAOx3    Lines/drains/tubes:    I&O's Summary    10 Steve 2021 07:01  -  11 Jun 2021 07:00  --------------------------------------------------------  IN: 400 mL / OUT: 550 mL / NET: -150 mL    11 Jun 2021 07:01  -  12 Jun 2021 06:15  --------------------------------------------------------  IN: 1340 mL / OUT: 1275 mL / NET: 65 mL        LABS:                        16.4   8.73  )-----------( 387      ( 11 Jun 2021 07:28 )             50.0     06-11    143  |  105  |  9   ----------------------------<  85  3.8   |  25  |  0.88    Ca    10.7<H>      11 Jun 2021 07:28  Phos  2.6     06-11  Mg     2.0     06-11          CAPILLARY BLOOD GLUCOSE            RADIOLOGY & ADDITIONAL STUDIES:

## 2021-06-14 ENCOUNTER — NON-APPOINTMENT (OUTPATIENT)
Age: 61
End: 2021-06-14

## 2021-06-14 LAB
SURGICAL PATHOLOGY STUDY: SIGNIFICANT CHANGE UP
SURGICAL PATHOLOGY STUDY: SIGNIFICANT CHANGE UP

## 2021-06-20 DIAGNOSIS — D12.5 BENIGN NEOPLASM OF SIGMOID COLON: ICD-10-CM

## 2021-06-20 DIAGNOSIS — J45.909 UNSPECIFIED ASTHMA, UNCOMPLICATED: ICD-10-CM

## 2021-06-20 DIAGNOSIS — Z88.6 ALLERGY STATUS TO ANALGESIC AGENT: ICD-10-CM

## 2021-06-20 DIAGNOSIS — N40.0 BENIGN PROSTATIC HYPERPLASIA WITHOUT LOWER URINARY TRACT SYMPTOMS: ICD-10-CM

## 2021-06-20 DIAGNOSIS — K21.9 GASTRO-ESOPHAGEAL REFLUX DISEASE WITHOUT ESOPHAGITIS: ICD-10-CM

## 2021-06-20 DIAGNOSIS — D18.00 HEMANGIOMA UNSPECIFIED SITE: ICD-10-CM

## 2021-06-20 DIAGNOSIS — I10 ESSENTIAL (PRIMARY) HYPERTENSION: ICD-10-CM

## 2021-06-20 DIAGNOSIS — K64.8 OTHER HEMORRHOIDS: ICD-10-CM

## 2021-06-20 DIAGNOSIS — E78.5 HYPERLIPIDEMIA, UNSPECIFIED: ICD-10-CM

## 2021-06-20 DIAGNOSIS — H91.8X9 OTHER SPECIFIED HEARING LOSS, UNSPECIFIED EAR: ICD-10-CM

## 2021-06-20 DIAGNOSIS — Z91.040 LATEX ALLERGY STATUS: ICD-10-CM

## 2021-06-20 DIAGNOSIS — L60.3 NAIL DYSTROPHY: ICD-10-CM

## 2021-06-20 DIAGNOSIS — M21.40 FLAT FOOT [PES PLANUS] (ACQUIRED), UNSPECIFIED FOOT: ICD-10-CM

## 2021-06-20 DIAGNOSIS — K62.1 RECTAL POLYP: ICD-10-CM

## 2021-06-20 DIAGNOSIS — K62.89 OTHER SPECIFIED DISEASES OF ANUS AND RECTUM: ICD-10-CM

## 2021-06-28 PROBLEM — D12.6 BENIGN NEOPLASM OF COLON, UNSPECIFIED: Chronic | Status: ACTIVE | Noted: 2021-06-09

## 2021-06-28 PROBLEM — H91.8X9 OTHER SPECIFIED HEARING LOSS, UNSPECIFIED EAR: Chronic | Status: ACTIVE | Noted: 2021-06-09

## 2021-06-28 PROBLEM — M21.40 FLAT FOOT [PES PLANUS] (ACQUIRED), UNSPECIFIED FOOT: Chronic | Status: ACTIVE | Noted: 2021-06-09

## 2021-06-28 PROBLEM — N40.0 BENIGN PROSTATIC HYPERPLASIA WITHOUT LOWER URINARY TRACT SYMPTOMS: Chronic | Status: ACTIVE | Noted: 2021-06-09

## 2021-06-28 PROBLEM — L60.3 NAIL DYSTROPHY: Chronic | Status: ACTIVE | Noted: 2021-06-09

## 2021-06-28 PROBLEM — J45.909 UNSPECIFIED ASTHMA, UNCOMPLICATED: Chronic | Status: ACTIVE | Noted: 2021-06-10

## 2021-06-28 PROBLEM — D18.00 HEMANGIOMA UNSPECIFIED SITE: Chronic | Status: ACTIVE | Noted: 2021-06-09

## 2021-06-28 PROBLEM — I10 ESSENTIAL (PRIMARY) HYPERTENSION: Chronic | Status: ACTIVE | Noted: 2021-06-09

## 2021-06-28 PROBLEM — K64.8 OTHER HEMORRHOIDS: Chronic | Status: ACTIVE | Noted: 2021-06-09

## 2021-06-28 PROBLEM — E78.5 HYPERLIPIDEMIA, UNSPECIFIED: Chronic | Status: ACTIVE | Noted: 2021-06-09

## 2021-06-28 PROBLEM — Z87.19 PERSONAL HISTORY OF OTHER DISEASES OF THE DIGESTIVE SYSTEM: Chronic | Status: ACTIVE | Noted: 2021-06-09

## 2021-06-28 PROBLEM — K62.9 DISEASE OF ANUS AND RECTUM, UNSPECIFIED: Chronic | Status: ACTIVE | Noted: 2021-06-09

## 2021-07-12 ENCOUNTER — APPOINTMENT (OUTPATIENT)
Dept: COLORECTAL SURGERY | Facility: CLINIC | Age: 61
End: 2021-07-12
Payer: COMMERCIAL

## 2021-07-12 VITALS
TEMPERATURE: 98.2 F | DIASTOLIC BLOOD PRESSURE: 96 MMHG | BODY MASS INDEX: 29.47 KG/M2 | WEIGHT: 199 LBS | SYSTOLIC BLOOD PRESSURE: 156 MMHG | HEIGHT: 69 IN | HEART RATE: 69 BPM

## 2021-07-12 DIAGNOSIS — K62.0 ANAL POLYP: ICD-10-CM

## 2021-07-12 PROCEDURE — 46922 EXCISION OF ANAL LESION(S): CPT | Mod: 79

## 2021-07-12 PROCEDURE — 99024 POSTOP FOLLOW-UP VISIT: CPT

## 2021-07-12 NOTE — HISTORY OF PRESENT ILLNESS
[FreeTextEntry1] : 61 yo M presents for f/u rectal polyp\par \par Pt was admitted for screening colonoscopy given incomplete colonoscopy secondary to incomplete preparation, s/p TAE of polyp on 6/10/21 and colonoscopy w/ Dr. Quiroga on 6/11\par \par Surgical Final Report\par Final Diagnosis\par \par 1. Posterior anal canal polyp, excision:\par - Fibroepithelial polyp\par \par 2. Posterior lateral anal canal polyp, excision:\par - Fibroepithelial polyp, chronically inflamed\par \par Note: There is no evidence of dysplasia.\par \par Colonoscopy (6/11/21):\par A sub-centimeter polyp in the sigmoid colon (50 cm from AV) s/p excision biopsy\par with cold forceps. Otherwise normal colonoscopy\par Pathology:\par Sigmoid colon, polypectomy:\par - Hyperplastic polyp\par \par c/o lump near anus w/ some soreness during BM and when walking. He may have strained at this time\par Denies pain, itching, burning or BRBPR\par BH: 1-3 times daily\par Denies ASA use. Took ibuprofen in the last week\par

## 2021-07-12 NOTE — ASSESSMENT
[FreeTextEntry1] : Pathology of anal canal lesion benign-reviewed with patient\par \par We will follow the results of anal margin lesion excised in the office today.\par \par Local perianal hygiene instructions reviewed.\par \par

## 2021-07-12 NOTE — PHYSICAL EXAM
[Abdomen Masses] : No abdominal masses [Abdomen Tenderness] : ~T No ~M abdominal tenderness [No HSM] : no hepatosplenomegaly [Posterior] : posteriorly [Normal] : was normal [None] : no [de-identified] : 1 cm anterior anal margin polypoid lesion- excised.

## 2021-07-22 ENCOUNTER — NON-APPOINTMENT (OUTPATIENT)
Age: 61
End: 2021-07-22

## 2021-10-25 ENCOUNTER — APPOINTMENT (OUTPATIENT)
Dept: COLORECTAL SURGERY | Facility: CLINIC | Age: 61
End: 2021-10-25

## 2021-11-15 ENCOUNTER — APPOINTMENT (OUTPATIENT)
Dept: COLORECTAL SURGERY | Facility: CLINIC | Age: 61
End: 2021-11-15
Payer: COMMERCIAL

## 2021-11-15 VITALS
DIASTOLIC BLOOD PRESSURE: 98 MMHG | SYSTOLIC BLOOD PRESSURE: 171 MMHG | WEIGHT: 204 LBS | BODY MASS INDEX: 30.21 KG/M2 | HEART RATE: 87 BPM | HEIGHT: 69 IN | TEMPERATURE: 98.3 F

## 2021-11-15 PROCEDURE — 99213 OFFICE O/P EST LOW 20 MIN: CPT | Mod: 25

## 2021-11-15 PROCEDURE — 46922 EXCISION OF ANAL LESION(S): CPT

## 2021-11-15 NOTE — PHYSICAL EXAM
[Abdomen Masses] : No abdominal masses [Abdomen Tenderness] : ~T No ~M abdominal tenderness [No HSM] : no hepatosplenomegaly [Normal] : was normal [None] : there was no rectal mass  [de-identified] : Anal pap performed [de-identified] : 2.5 cm anterior anal margin polypoid lesion- excised. [FreeTextEntry1] : Medical assistant was present for the entire exam.\par \par Anoscopy was performed for evaluation of the patients rectal bleeding  history .\par The risks, benefits and alternatives were reviewed.\par \par A lighted anoscope was passed into the anal canal and the entire anal mucosal surface was inspected..  \par The findings revealed moderate internal hemorrhoids.\par No masses or lesions were identified.\par \par

## 2021-11-15 NOTE — ASSESSMENT
[FreeTextEntry1] : Reviewed with the patient etiology of anal warts and its association with human papilloma virus. The sexually transmitted nature of this infection, as well as the need for safe sexual practices has been reviewed. The treatment options at this time including medical therapy, and surgical fulguration had been outlined discussed. The risks, benefits, and alternatives of treatment including postprocedure pain, bleeding, infection, anal stenosis, change in bowel habits, recurrent warts, as well as the risk and need for recurrent procedures were reviewed. The risk of anal dysplasia and long-term risk of malignancy have been outlined. The role surveillance has been stressed. \par \par I personally spent 30 minutes the patient with greater than 50% of the time counseling and coordinating the patient's care.\par

## 2021-11-15 NOTE — HISTORY OF PRESENT ILLNESS
[FreeTextEntry1] : Patient returns in follow-up.  Doing well.  Prior history of notable condyloma and anal margin and benign anal rectal polyp.\par \par Denies pain, bleeding.\par \par Bowel movements regular

## 2021-11-22 LAB — ANAL PAP CYTOLOGY: NORMAL

## 2022-03-14 ENCOUNTER — APPOINTMENT (OUTPATIENT)
Dept: COLORECTAL SURGERY | Facility: CLINIC | Age: 62
End: 2022-03-14
Payer: COMMERCIAL

## 2022-03-14 VITALS
BODY MASS INDEX: 29.33 KG/M2 | SYSTOLIC BLOOD PRESSURE: 168 MMHG | WEIGHT: 198 LBS | DIASTOLIC BLOOD PRESSURE: 108 MMHG | HEART RATE: 84 BPM | HEIGHT: 69 IN | TEMPERATURE: 98.4 F

## 2022-03-14 PROCEDURE — 99213 OFFICE O/P EST LOW 20 MIN: CPT | Mod: 25

## 2022-03-14 PROCEDURE — 46910 DESTRUCTION ANAL LESION(S): CPT

## 2022-03-14 NOTE — PHYSICAL EXAM
[No HSM] : no hepatosplenomegaly [Normal] : was normal [None] : there was no rectal mass  [Abdomen Masses] : No abdominal masses [Abdomen Tenderness] : ~T No ~M abdominal tenderness [de-identified] : Anal pap performed [de-identified] : Posterior anal margin condyloma.  Excised.  Fulgurated [FreeTextEntry1] : Medical assistant was present for the entire exam.\par \par Anoscopy was performed for evaluation of the patients rectal bleeding  history .\par The risks, benefits and alternatives were reviewed.\par \par A lighted anoscope was passed into the anal canal and the entire anal mucosal surface was inspected..  \par The findings revealed moderate internal hemorrhoids.\par No masses or lesions were identified.\par \par

## 2022-03-21 ENCOUNTER — NON-APPOINTMENT (OUTPATIENT)
Age: 62
End: 2022-03-21

## 2022-03-21 LAB — ANAL PAP CYTOLOGY: NORMAL

## 2022-06-17 ENCOUNTER — APPOINTMENT (OUTPATIENT)
Dept: UROLOGY | Facility: CLINIC | Age: 62
End: 2022-06-17

## 2022-06-24 ENCOUNTER — APPOINTMENT (OUTPATIENT)
Dept: UROLOGY | Facility: CLINIC | Age: 62
End: 2022-06-24
Payer: COMMERCIAL

## 2022-06-24 VITALS
SYSTOLIC BLOOD PRESSURE: 167 MMHG | HEART RATE: 87 BPM | BODY MASS INDEX: 29.77 KG/M2 | HEIGHT: 69 IN | TEMPERATURE: 98.7 F | OXYGEN SATURATION: 98 % | DIASTOLIC BLOOD PRESSURE: 109 MMHG | WEIGHT: 201 LBS

## 2022-06-24 PROCEDURE — 99204 OFFICE O/P NEW MOD 45 MIN: CPT

## 2022-06-27 ENCOUNTER — NON-APPOINTMENT (OUTPATIENT)
Age: 62
End: 2022-06-27

## 2022-06-27 LAB
ANION GAP SERPL CALC-SCNC: 13 MMOL/L
BUN SERPL-MCNC: 5 MG/DL
CALCIUM SERPL-MCNC: 10.3 MG/DL
CHLORIDE SERPL-SCNC: 107 MMOL/L
CO2 SERPL-SCNC: 25 MMOL/L
CREAT SERPL-MCNC: 0.85 MG/DL
EGFR: 98 ML/MIN/1.73M2
GLUCOSE SERPL-MCNC: 97 MG/DL
POTASSIUM SERPL-SCNC: 4.1 MMOL/L
PSA FREE FLD-MCNC: 17 %
PSA FREE SERPL-MCNC: 1.64 NG/ML
PSA SERPL-MCNC: 9.65 NG/ML
SODIUM SERPL-SCNC: 144 MMOL/L

## 2022-06-27 NOTE — HISTORY OF PRESENT ILLNESS
[FreeTextEntry1] : 61 year old male presents with elevated PSA \par PSA Feb 2022 9.47 \par Colonoscopy in May 2022\par Reports urinary frequency but minimal bother. Has stopped diuretic but without much difference in frequency \par Strong stream \par Reports having seen Dr Chapin and Dr Marie over 10 years ago. He was told back then that he did not have prostate cancer \par \par No family hx of  malignancy +\par \par \par

## 2022-06-27 NOTE — ASSESSMENT
[FreeTextEntry1] : The natural history of prostate cancer and ongoing controversy regarding screening and potential treatment outcomes of prostate cancer has been discussed with the patient. The meaning of a false positive PSA and a false negative PSA has been discussed. He indicates understanding of the limitations of this screening test \par REviewed ptions continue  surveillance , biopsy or mri \par Risks and benefits reviewed\par will get mri to eval for poss fusion bx

## 2022-07-25 ENCOUNTER — APPOINTMENT (OUTPATIENT)
Dept: COLORECTAL SURGERY | Facility: CLINIC | Age: 62
End: 2022-07-25

## 2022-07-25 VITALS
SYSTOLIC BLOOD PRESSURE: 135 MMHG | HEIGHT: 69 IN | HEART RATE: 74 BPM | DIASTOLIC BLOOD PRESSURE: 83 MMHG | WEIGHT: 195 LBS | BODY MASS INDEX: 28.88 KG/M2 | TEMPERATURE: 98.4 F

## 2022-07-25 PROCEDURE — 99214 OFFICE O/P EST MOD 30 MIN: CPT | Mod: 25

## 2022-07-25 PROCEDURE — 46600 DIAGNOSTIC ANOSCOPY SPX: CPT

## 2022-07-25 NOTE — PHYSICAL EXAM
[No HSM] : no hepatosplenomegaly [Normal] : was normal [None] : there was no rectal mass  [Abdomen Masses] : No abdominal masses [Abdomen Tenderness] : ~T No ~M abdominal tenderness [Excoriation] : no perianal excoriation [Wart] : no warts [FreeTextEntry1] : Medical assistant was present for the entire exam.\par \par Anoscopy was performed for evaluation of the patients rectal bleeding  history .\par The risks, benefits and alternatives were reviewed.\par \par A lighted anoscope was passed into the anal canal and the entire anal mucosal surface was inspected..  \par The findings revealed moderate internal hemorrhoids.\par No masses or lesions were identified.\par \par

## 2022-07-25 NOTE — HISTORY OF PRESENT ILLNESS
[FreeTextEntry1] : 60 y/o M presents for f/u evaluation of anal condyloma, benign rectal polyp\par PSH TAE of polyp on 6/10/21 w/ colonoscopy by Dr. Milian\par \par Seen in the office 3/14/22: Anal pap performed, no anal fissures. Posterior anal margin condyloma, excised fulgurated. The sphincter tone was normal. Anoscopy findings, moderate internal hemorrhoids. No masses or lesions noted. Recommendations and surveillance of HPV provided. \par \par Anal Pap 3/14/22: \par Specimen(s) Submitted \par Anal Mitra HPVHR reflex\par Negative for intraepithelial lesion or malignancy\par Reactive cellular changes present \par Verified by HARRISON Clifton MD. \par \par \par Surgical pathology report 11/15/21\par Anterior anal margin\par Final Diagnosis: \par - Anterior anal margin, excision: \par - Condyloma acuminata. \par Verified Merrill Whitfield MD\par \par Anal Pap 11/15/21: \par Final Diagnosis\par - Epithelial Cell abnormality\par Rare Atypical squamous cells of undetermined significance (ASC-US)\par Verified by Dr. Mary Ivy\par \par \par Surgical Final Report 6/14/21\par Final Diagnosis\par \par 1. Posterior anal canal polyp, excision:\par - Fibroepithelial polyp\par \par 2. Posterior lateral anal canal polyp, excision:\par - Fibroepithelial polyp, chronically inflamed\par \par Note: There is no evidence of dysplasia.\par \par Colonoscopy (6/11/21):\par A sub-centimeter polyp in the sigmoid colon (50 cm from AV) s/p excision biopsy\par with cold forceps. Otherwise normal colonoscopy\par Pathology:\par Sigmoid colon, polypectomy:\par - Hyperplastic polyp\par \par Denies any new diagnoses, procedures, or medication changes since he was last seen. Denies any new anorectal issues since March.

## 2022-07-25 NOTE — ASSESSMENT
[FreeTextEntry1] : Reviewed with the patient etiology of anal warts and its association with human papilloma virus. The sexually transmitted nature of this infection, as well as the need for safe sexual practices has been reviewed. The treatment options at this time including medical therapy, and surgical fulguration had been outlined discussed. The risk of anal dysplasia and long-term risk of malignancy have been outlined. The role surveillance has been stressed. \par \par I personally spent 30 minutes the patient with greater than 50% of the time counseling and coordinating the patient's care.\par

## 2022-07-29 ENCOUNTER — APPOINTMENT (OUTPATIENT)
Dept: UROLOGY | Facility: CLINIC | Age: 62
End: 2022-07-29

## 2022-07-29 VITALS
TEMPERATURE: 97.1 F | OXYGEN SATURATION: 99 % | WEIGHT: 197 LBS | HEART RATE: 80 BPM | SYSTOLIC BLOOD PRESSURE: 130 MMHG | DIASTOLIC BLOOD PRESSURE: 75 MMHG | HEIGHT: 69 IN | BODY MASS INDEX: 29.18 KG/M2

## 2022-07-29 PROCEDURE — 99214 OFFICE O/P EST MOD 30 MIN: CPT

## 2022-08-05 LAB
BACTERIA UR CULT: NORMAL
PSA SERPL-MCNC: 10 NG/ML

## 2022-08-05 NOTE — HISTORY OF PRESENT ILLNESS
[FreeTextEntry1] : 61 year old male presents with elevated PSA \par PSA Feb 2022 9.47 \par Colonoscopy in May 2022\par Reports urinary frequency but minimal bother. Has stopped diuretic but without much difference in frequency \par Strong stream \par Reports having seen Dr Chapin and Dr Marie over 10 years ago. He was told back then that he did not have prostate cancer \par \par No family hx of  malignancy +\par \par \par mri \par pirad 2 52 cc and several bladder stones

## 2022-08-05 NOTE — ASSESSMENT
[FreeTextEntry1] : pvr 115\par will start tamsulosin \par f/u for cysto \par check ucx \par \par \par mri reviewed pirad clinically significant ca unlikley \par 52 cc \par will repeat psa\par decide on bx after cysto

## 2022-08-26 ENCOUNTER — APPOINTMENT (OUTPATIENT)
Dept: UROLOGY | Facility: CLINIC | Age: 62
End: 2022-08-26

## 2022-12-12 ENCOUNTER — APPOINTMENT (OUTPATIENT)
Dept: COLORECTAL SURGERY | Facility: CLINIC | Age: 62
End: 2022-12-12

## 2023-07-10 ENCOUNTER — APPOINTMENT (OUTPATIENT)
Dept: COLORECTAL SURGERY | Facility: CLINIC | Age: 63
End: 2023-07-10
Payer: COMMERCIAL

## 2023-07-10 VITALS
HEART RATE: 92 BPM | WEIGHT: 188 LBS | TEMPERATURE: 98.1 F | SYSTOLIC BLOOD PRESSURE: 161 MMHG | DIASTOLIC BLOOD PRESSURE: 98 MMHG | HEIGHT: 69 IN | BODY MASS INDEX: 27.85 KG/M2

## 2023-07-10 DIAGNOSIS — A63.0 ANOGENITAL (VENEREAL) WARTS: ICD-10-CM

## 2023-07-10 PROCEDURE — 99213 OFFICE O/P EST LOW 20 MIN: CPT | Mod: 25

## 2023-07-10 PROCEDURE — 46600 DIAGNOSTIC ANOSCOPY SPX: CPT

## 2023-07-10 NOTE — PHYSICAL EXAM
[Abdomen Masses] : No abdominal masses [Abdomen Tenderness] : ~T No ~M abdominal tenderness [No HSM] : no hepatosplenomegaly [Excoriation] : no perianal excoriation [Wart] : no warts [Normal] : was normal [None] : there was no rectal mass  [de-identified] : Anal pap performed [FreeTextEntry1] : Medical assistant was present for the entire exam.\par \par Anoscopy was performed for evaluation of the patients rectal bleeding  history .\par The risks, benefits and alternatives were reviewed.\par \par A lighted anoscope was passed into the anal canal and the entire anal mucosal surface was inspected..  \par The findings revealed moderate internal hemorrhoids.  Small right posterior anal canal papilla.\par No masses or lesions were identified.\par \par

## 2023-07-10 NOTE — HISTORY OF PRESENT ILLNESS
[FreeTextEntry1] : 63 y/o M presents for follow up evaluation of anal condyloma, and benign rectal polyp \par PSH TAE of polyp on 6/10/21, pathology c/w fibroepithelial polyp\par Last colonoscopy by Dr. Milian 6/11/21, hyperplastic polyp removed from sigmoid colon\par \par Seen 11/15/21, 2.5 cm anterior anal margin polypoid lesion excised\par Surgical pathology report 11/15/21\par Anterior anal margin\par Final Diagnosis: \par - Anterior anal margin, excision: \par - Condyloma acuminata. \par Verified Merrill Whitfield MD\par \par Anal Pap 11/15/21: \par Final Diagnosis\par - Epithelial Cell abnormality\par Rare Atypical squamous cells of undetermined significance (ASC-US)\par Verified by Dr. Mary Ivy\par \par \par Seen in the office 3/14/22: Anal pap performed, no anal fissures. Posterior anal margin condyloma, excised fulgurated. The sphincter tone was normal. Anoscopy findings, moderate internal hemorrhoids. No masses or lesions noted. Recommendations and surveillance of HPV provided. \par \par Anal Pap 3/14/22: \par Specimen(s) Submitted \par Anal Mitra HPVHR reflex\par Negative for intraepithelial lesion or malignancy\par Reactive cellular changes present \par Verified by HARRISON Clifton MD. \par \par Most recent office visit 7/25/22, no perianal excoriation, no warts. No rectal tenderness. No rectal mass. Moderate internal hemorrhoids on anoscopy. \par \par Pt presents for one year follow up.\par \par Denies anal pain or bleeding.

## 2023-07-17 RX ORDER — TAMSULOSIN HYDROCHLORIDE 0.4 MG/1
0.4 CAPSULE ORAL
Qty: 30 | Refills: 2 | Status: ACTIVE | COMMUNITY
Start: 2022-07-29 | End: 1900-01-01

## 2023-07-19 ENCOUNTER — APPOINTMENT (OUTPATIENT)
Dept: UROLOGY | Facility: CLINIC | Age: 63
End: 2023-07-19
Payer: COMMERCIAL

## 2023-07-19 VITALS
HEIGHT: 69 IN | SYSTOLIC BLOOD PRESSURE: 142 MMHG | RESPIRATION RATE: 16 BRPM | OXYGEN SATURATION: 96 % | BODY MASS INDEX: 27.85 KG/M2 | HEART RATE: 100 BPM | TEMPERATURE: 98.6 F | WEIGHT: 188 LBS | DIASTOLIC BLOOD PRESSURE: 86 MMHG

## 2023-07-19 DIAGNOSIS — N21.0 CALCULUS IN BLADDER: ICD-10-CM

## 2023-07-19 DIAGNOSIS — N40.1 BENIGN PROSTATIC HYPERPLASIA WITH LOWER URINARY TRACT SYMPMS: ICD-10-CM

## 2023-07-19 DIAGNOSIS — N13.8 BENIGN PROSTATIC HYPERPLASIA WITH LOWER URINARY TRACT SYMPMS: ICD-10-CM

## 2023-07-19 DIAGNOSIS — R97.20 ELEVATED PROSTATE, SPECIFIC ANTIGEN [PSA]: ICD-10-CM

## 2023-07-19 PROCEDURE — 99214 OFFICE O/P EST MOD 30 MIN: CPT

## 2023-07-25 ENCOUNTER — NON-APPOINTMENT (OUTPATIENT)
Age: 63
End: 2023-07-25

## 2023-07-25 LAB — ANAL PAP CYTOLOGY: NORMAL

## 2023-08-07 PROBLEM — N40.1 BPH WITH URINARY OBSTRUCTION: Status: ACTIVE | Noted: 2022-07-29

## 2023-08-07 PROBLEM — R97.20 ELEVATED PROSTATE SPECIFIC ANTIGEN (PSA): Status: ACTIVE | Noted: 2022-06-24

## 2023-08-07 PROBLEM — N21.0 BLADDER CALCULUS: Status: ACTIVE | Noted: 2022-07-29

## 2023-08-07 NOTE — HISTORY OF PRESENT ILLNESS
[FreeTextEntry1] : 62 year old male presents with elevated PSA  PSA Feb 2022 9.47  Colonoscopy in May 2022 Reports urinary frequency but minimal bother. Has stopped diuretic but without much difference in frequency  Strong stream  Reports having seen Dr Chapin and Dr Marie over 10 years ago. He was told back then that he did not have prostate cancer   No family hx of  malignancy +   mri  pirad 2 52 cc and several bladder stones   did not f/u for cysto

## 2023-08-07 NOTE — PHYSICAL EXAM
[General Appearance - Well Developed] : well developed [General Appearance - Well Nourished] : well nourished [Normal Appearance] : normal appearance [Well Groomed] : well groomed [General Appearance - In No Acute Distress] : no acute distress [Abdomen Soft] : soft [Abdomen Tenderness] : non-tender [Costovertebral Angle Tenderness] : no ~M costovertebral angle tenderness [Urethral Meatus] : meatus normal [Urinary Bladder Findings] : the bladder was normal on palpation [Scrotum] : the scrotum was normal [Testes Mass (___cm)] : there were no testicular masses [No Prostate Nodules] : no prostate nodules [Edema] : no peripheral edema

## 2023-08-08 LAB — PSA SERPL-MCNC: 11.8 NG/ML

## 2023-08-24 ENCOUNTER — APPOINTMENT (OUTPATIENT)
Dept: UROLOGY | Facility: CLINIC | Age: 63
End: 2023-08-24

## 2024-07-15 ENCOUNTER — APPOINTMENT (OUTPATIENT)
Dept: COLORECTAL SURGERY | Facility: CLINIC | Age: 64
End: 2024-07-15
Payer: COMMERCIAL

## 2024-07-15 VITALS
HEART RATE: 74 BPM | HEIGHT: 69 IN | DIASTOLIC BLOOD PRESSURE: 90 MMHG | SYSTOLIC BLOOD PRESSURE: 150 MMHG | TEMPERATURE: 98.2 F | BODY MASS INDEX: 26.81 KG/M2 | WEIGHT: 181 LBS

## 2024-07-15 DIAGNOSIS — A63.0 ANOGENITAL (VENEREAL) WARTS: ICD-10-CM

## 2024-07-15 PROCEDURE — 99213 OFFICE O/P EST LOW 20 MIN: CPT | Mod: 25

## 2024-07-15 PROCEDURE — 46600 DIAGNOSTIC ANOSCOPY SPX: CPT

## 2024-07-18 ENCOUNTER — NON-APPOINTMENT (OUTPATIENT)
Age: 64
End: 2024-07-18

## 2024-07-18 LAB — ANAL PAP CYTOLOGY: NORMAL

## 2025-07-09 NOTE — H&P ADULT - NSHPLABSRESULTS_GEN_ALL_CORE
Called Pt and informed that XR has been ordered and scheduled.  Stas GILMAN  
Other: Patient is wondering if he can get an xray of his hand before his appointment on 7/16/25. Please call patient back with info.      Could we send this information to you in [employer] or would you prefer to receive a phone call?:   Patient would prefer a phone call   Okay to leave a detailed message?: Yes at Cell number on file:    Telephone Information:   Mobile [phone]       
Reviewed.

## 2025-08-11 ENCOUNTER — NON-APPOINTMENT (OUTPATIENT)
Age: 65
End: 2025-08-11

## 2025-08-11 ENCOUNTER — APPOINTMENT (OUTPATIENT)
Dept: COLORECTAL SURGERY | Facility: CLINIC | Age: 65
End: 2025-08-11
Payer: MEDICARE

## 2025-08-11 VITALS
HEIGHT: 69 IN | HEART RATE: 74 BPM | TEMPERATURE: 98 F | DIASTOLIC BLOOD PRESSURE: 75 MMHG | WEIGHT: 199 LBS | BODY MASS INDEX: 29.47 KG/M2 | SYSTOLIC BLOOD PRESSURE: 118 MMHG

## 2025-08-11 DIAGNOSIS — K62.82 DYSPLASIA OF ANUS: ICD-10-CM

## 2025-08-11 PROCEDURE — 99203 OFFICE O/P NEW LOW 30 MIN: CPT | Mod: 25

## 2025-08-11 PROCEDURE — 46600 DIAGNOSTIC ANOSCOPY SPX: CPT

## 2025-08-11 PROCEDURE — 99213 OFFICE O/P EST LOW 20 MIN: CPT | Mod: 25

## 2025-08-15 ENCOUNTER — NON-APPOINTMENT (OUTPATIENT)
Age: 65
End: 2025-08-15

## 2025-08-15 LAB — ANAL PAP CYTOLOGY: NORMAL
